# Patient Record
Sex: FEMALE | Race: WHITE | NOT HISPANIC OR LATINO | Employment: OTHER | URBAN - METROPOLITAN AREA
[De-identification: names, ages, dates, MRNs, and addresses within clinical notes are randomized per-mention and may not be internally consistent; named-entity substitution may affect disease eponyms.]

---

## 2017-10-13 ENCOUNTER — TRANSCRIBE ORDERS (OUTPATIENT)
Dept: ADMINISTRATIVE | Facility: HOSPITAL | Age: 67
End: 2017-10-13

## 2017-10-13 DIAGNOSIS — Z12.31 VISIT FOR SCREENING MAMMOGRAM: Primary | ICD-10-CM

## 2017-10-20 ENCOUNTER — HOSPITAL ENCOUNTER (OUTPATIENT)
Dept: RADIOLOGY | Facility: HOSPITAL | Age: 67
Discharge: HOME | End: 2017-10-20
Payer: MEDICARE

## 2017-10-20 DIAGNOSIS — Z12.31 VISIT FOR SCREENING MAMMOGRAM: ICD-10-CM

## 2017-10-20 PROCEDURE — G0202 SCR MAMMO BI INCL CAD: HCPCS

## 2021-04-08 DIAGNOSIS — Z23 ENCOUNTER FOR IMMUNIZATION: ICD-10-CM

## 2021-12-19 ENCOUNTER — OFFICE VISIT (OUTPATIENT)
Dept: URGENT CARE | Facility: CLINIC | Age: 71
End: 2021-12-19
Payer: MEDICARE

## 2021-12-19 VITALS
BODY MASS INDEX: 25.1 KG/M2 | TEMPERATURE: 97.9 F | RESPIRATION RATE: 18 BRPM | HEIGHT: 64 IN | DIASTOLIC BLOOD PRESSURE: 82 MMHG | WEIGHT: 147 LBS | HEART RATE: 86 BPM | SYSTOLIC BLOOD PRESSURE: 140 MMHG | OXYGEN SATURATION: 99 %

## 2021-12-19 DIAGNOSIS — B35.4 TINEA CORPORIS: Primary | ICD-10-CM

## 2021-12-19 PROCEDURE — 99213 OFFICE O/P EST LOW 20 MIN: CPT | Performed by: PHYSICIAN ASSISTANT

## 2021-12-19 RX ORDER — NYSTATIN AND TRIAMCINOLONE ACETONIDE 100000; 1 [USP'U]/G; MG/G
OINTMENT TOPICAL 2 TIMES DAILY
Qty: 60 G | Refills: 0 | Status: SHIPPED | OUTPATIENT
Start: 2021-12-19

## 2021-12-19 RX ORDER — NYSTATIN AND TRIAMCINOLONE ACETONIDE 100000; 1 [USP'U]/G; MG/G
OINTMENT TOPICAL 2 TIMES DAILY
Qty: 60 G | Refills: 0 | Status: SHIPPED | OUTPATIENT
Start: 2021-12-19 | End: 2021-12-19 | Stop reason: SDUPTHER

## 2021-12-19 NOTE — PATIENT INSTRUCTIONS
Tinea Corporis: Nystatin-triamcinolone cream prescribed to be applied topically to the area  Keep the area clean and dry otherwise  We discussed the use of baby powder to keep the area dry  Dry immediately after a shower or a bath  Follow up with a Dermatologist for further treatment and management of the lesions

## 2021-12-19 NOTE — PROGRESS NOTES
[facility]        NAME: Manuel Doty is a 66 y o  female  : 1950    MRN: [id number]  DATE: 2021  TIME: 2:54PM    Assessment and Plan   Tinea corporis [B35 4]  1  Tinea corporis  nystatin-triamcinolone (MYCOLOG-II) ointment    DISCONTINUED: nystatin-triamcinolone (MYCOLOG-II) ointment         Patient Instructions   Tinea Corporis: Nystatin-triamcinolone cream prescribed to be applied topically to the area  Keep the area clean and dry otherwise  We discussed the use of baby powder to keep the area dry  Dry immediately after a shower or a bath  Follow up with a Dermatologist for further treatment and management of the lesions  Follow up with PCP in 3-5 days  Proceed to  ER if symptoms worsen  Chief Complaint     Chief Complaint   Patient presents with    Rash     [place] X 2 YRS RESPONDS TO LOTRIMAZOLE CREAM CONCERNED THAT IS ISNT RING WORM SINCE IT HAS REOCCURRED AGAIN          History of Present Illness       The patient is a 67-year-old female who presents today for bilateral axillary rash x 2 years  She states that she has a hx of tinea corporis and was placed on Lotrisone cream which did clear her rash  She states that the annular lesions have reoccurred multiple times  She has no fever, chills, body aches  No new lotions, detergents  No new medications  No recent travel  She has considered seeing a Dermatologist        Review of Systems   Review of Systems   Constitutional: Negative for activity change, appetite change, chills, diaphoresis, fatigue and fever  HENT: Negative for facial swelling, sore throat and trouble swallowing  Respiratory: Negative for chest tightness, shortness of breath, wheezing and stridor  Cardiovascular: Negative for chest pain and palpitations  Skin: Positive for rash  Allergic/Immunologic: Negative for environmental allergies, food allergies and immunocompromised state     Neurological: Negative for dizziness and light-headedness  Hematological: Negative for adenopathy  Does not bruise/bleed easily  Current Medications       Current Outpatient Medications:     LISINOPRIL PO, Take by mouth, Disp: , Rfl:     nystatin-triamcinolone (MYCOLOG-II) ointment, Apply topically 2 (two) times a day, Disp: 60 g, Rfl: 0    Current Allergies     Allergies as of 12/19/2021    (No Known Allergies)            The following portions of the patient's history were reviewed and updated as appropriate: allergies, current medications, past family history, past medical history, past social history, past surgical history and problem list      No past medical history on file  No past surgical history on file  No family history on file  Medications have been verified  Objective   /82   Pulse 86   Temp 97 9 °F (36 6 °C)   Resp 18   Ht 5' 4" (1 626 m)   Wt 66 7 kg (147 lb)   SpO2 99%   BMI 25 23 kg/m²   No LMP recorded  Patient is postmenopausal        Physical Exam     Physical Exam  Vitals and nursing note reviewed  Constitutional:       General: She is not in acute distress  Appearance: She is well-developed  She is not diaphoretic  HENT:      Mouth/Throat:      Pharynx: Uvula midline  Cardiovascular:      Rate and Rhythm: Normal rate and regular rhythm  Pulses: Normal pulses  Heart sounds: Normal heart sounds, S1 normal and S2 normal  No murmur heard  Pulmonary:      Effort: Pulmonary effort is normal  No tachypnea, accessory muscle usage or respiratory distress  Breath sounds: Normal breath sounds  No stridor  No decreased breath sounds, wheezing, rhonchi or rales  Skin:     Capillary Refill: Capillary refill takes less than 2 seconds  Findings: Rash present  Comments:  There are multiple annular, raised, erythematous lesions with central clearing and fine scaling around the border over the axillary regions bilaterally

## 2022-07-22 ENCOUNTER — HOSPITAL ENCOUNTER (EMERGENCY)
Facility: HOSPITAL | Age: 72
Discharge: HOME | End: 2022-07-22
Attending: EMERGENCY MEDICINE
Payer: MEDICARE

## 2022-07-22 ENCOUNTER — APPOINTMENT (EMERGENCY)
Dept: RADIOLOGY | Facility: HOSPITAL | Age: 72
End: 2022-07-22
Payer: MEDICARE

## 2022-07-22 VITALS
BODY MASS INDEX: 27.36 KG/M2 | DIASTOLIC BLOOD PRESSURE: 74 MMHG | TEMPERATURE: 97.4 F | RESPIRATION RATE: 18 BRPM | HEIGHT: 64 IN | HEART RATE: 89 BPM | WEIGHT: 160.27 LBS | SYSTOLIC BLOOD PRESSURE: 146 MMHG | OXYGEN SATURATION: 96 %

## 2022-07-22 DIAGNOSIS — R55 SYNCOPE: Primary | ICD-10-CM

## 2022-07-22 LAB
ALBUMIN SERPL BCP-MCNC: 3.6 G/DL (ref 3.5–5)
ALP SERPL-CCNC: 67 U/L (ref 46–116)
ALT SERPL W P-5'-P-CCNC: 17 U/L (ref 12–78)
ANION GAP SERPL CALCULATED.3IONS-SCNC: 13 MMOL/L (ref 4–13)
APTT PPP: 24 SECONDS (ref 23–37)
AST SERPL W P-5'-P-CCNC: 5 U/L (ref 5–45)
BASOPHILS # BLD AUTO: 0.06 THOUSANDS/ΜL (ref 0–0.1)
BASOPHILS NFR BLD AUTO: 1 % (ref 0–1)
BILIRUB SERPL-MCNC: 0.35 MG/DL (ref 0.2–1)
BILIRUB UR QL STRIP: NEGATIVE
BUN SERPL-MCNC: 23 MG/DL (ref 5–25)
CALCIUM SERPL-MCNC: 8.4 MG/DL (ref 8.3–10.1)
CARDIAC TROPONIN I PNL SERPL HS: 3 NG/L
CHLORIDE SERPL-SCNC: 103 MMOL/L (ref 96–108)
CLARITY UR: CLEAR
CO2 SERPL-SCNC: 23 MMOL/L (ref 21–32)
COLOR UR: ABNORMAL
CREAT SERPL-MCNC: 0.93 MG/DL (ref 0.6–1.3)
EOSINOPHIL # BLD AUTO: 0.13 THOUSAND/ΜL (ref 0–0.61)
EOSINOPHIL NFR BLD AUTO: 1 % (ref 0–6)
ERYTHROCYTE [DISTWIDTH] IN BLOOD BY AUTOMATED COUNT: 17.4 % (ref 11.6–15.1)
ETHANOL SERPL-MCNC: 25 MG/DL (ref 0–3)
GFR SERPL CREATININE-BSD FRML MDRD: 60 ML/MIN/1.73SQ M
GLUCOSE SERPL-MCNC: 173 MG/DL (ref 65–140)
GLUCOSE UR STRIP-MCNC: ABNORMAL MG/DL
HCT VFR BLD AUTO: 34.4 % (ref 34.8–46.1)
HGB BLD-MCNC: 10.7 G/DL (ref 11.5–15.4)
HGB UR QL STRIP.AUTO: NEGATIVE
IMM GRANULOCYTES # BLD AUTO: 0.06 THOUSAND/UL (ref 0–0.2)
IMM GRANULOCYTES NFR BLD AUTO: 1 % (ref 0–2)
INR PPP: 0.97 (ref 0.84–1.19)
KETONES UR STRIP-MCNC: NEGATIVE MG/DL
LEUKOCYTE ESTERASE UR QL STRIP: NEGATIVE
LYMPHOCYTES # BLD AUTO: 2.5 THOUSANDS/ΜL (ref 0.6–4.47)
LYMPHOCYTES NFR BLD AUTO: 26 % (ref 14–44)
MCH RBC QN AUTO: 20.3 PG (ref 26.8–34.3)
MCHC RBC AUTO-ENTMCNC: 31.1 G/DL (ref 31.4–37.4)
MCV RBC AUTO: 65 FL (ref 82–98)
MONOCYTES # BLD AUTO: 0.5 THOUSAND/ΜL (ref 0.17–1.22)
MONOCYTES NFR BLD AUTO: 5 % (ref 4–12)
NEUTROPHILS # BLD AUTO: 6.5 THOUSANDS/ΜL (ref 1.85–7.62)
NEUTS SEG NFR BLD AUTO: 66 % (ref 43–75)
NITRITE UR QL STRIP: NEGATIVE
NRBC BLD AUTO-RTO: 0 /100 WBCS
PH UR STRIP.AUTO: 6 [PH]
PLATELET # BLD AUTO: 279 THOUSANDS/UL (ref 149–390)
PMV BLD AUTO: 10.2 FL (ref 8.9–12.7)
POTASSIUM SERPL-SCNC: 3.4 MMOL/L (ref 3.5–5.3)
PROT SERPL-MCNC: 7.5 G/DL (ref 6.4–8.4)
PROT UR STRIP-MCNC: NEGATIVE MG/DL
PROTHROMBIN TIME: 13 SECONDS (ref 11.6–14.5)
RBC # BLD AUTO: 5.28 MILLION/UL (ref 3.81–5.12)
SARS-COV-2 RNA RESP QL NAA+PROBE: NEGATIVE
SODIUM SERPL-SCNC: 139 MMOL/L (ref 135–147)
SP GR UR STRIP.AUTO: <=1.005 (ref 1–1.03)
UROBILINOGEN UR QL STRIP.AUTO: 0.2 E.U./DL
WBC # BLD AUTO: 9.75 THOUSAND/UL (ref 4.31–10.16)

## 2022-07-22 PROCEDURE — 96360 HYDRATION IV INFUSION INIT: CPT

## 2022-07-22 PROCEDURE — U0005 INFEC AGEN DETEC AMPLI PROBE: HCPCS | Performed by: EMERGENCY MEDICINE

## 2022-07-22 PROCEDURE — 85610 PROTHROMBIN TIME: CPT | Performed by: EMERGENCY MEDICINE

## 2022-07-22 PROCEDURE — 99284 EMERGENCY DEPT VISIT MOD MDM: CPT

## 2022-07-22 PROCEDURE — 85025 COMPLETE CBC W/AUTO DIFF WBC: CPT | Performed by: EMERGENCY MEDICINE

## 2022-07-22 PROCEDURE — U0003 INFECTIOUS AGENT DETECTION BY NUCLEIC ACID (DNA OR RNA); SEVERE ACUTE RESPIRATORY SYNDROME CORONAVIRUS 2 (SARS-COV-2) (CORONAVIRUS DISEASE [COVID-19]), AMPLIFIED PROBE TECHNIQUE, MAKING USE OF HIGH THROUGHPUT TECHNOLOGIES AS DESCRIBED BY CMS-2020-01-R: HCPCS | Performed by: EMERGENCY MEDICINE

## 2022-07-22 PROCEDURE — 93005 ELECTROCARDIOGRAM TRACING: CPT

## 2022-07-22 PROCEDURE — 80053 COMPREHEN METABOLIC PANEL: CPT | Performed by: EMERGENCY MEDICINE

## 2022-07-22 PROCEDURE — 84484 ASSAY OF TROPONIN QUANT: CPT | Performed by: EMERGENCY MEDICINE

## 2022-07-22 PROCEDURE — 99285 EMERGENCY DEPT VISIT HI MDM: CPT | Performed by: EMERGENCY MEDICINE

## 2022-07-22 PROCEDURE — 85730 THROMBOPLASTIN TIME PARTIAL: CPT | Performed by: EMERGENCY MEDICINE

## 2022-07-22 PROCEDURE — 36415 COLL VENOUS BLD VENIPUNCTURE: CPT | Performed by: EMERGENCY MEDICINE

## 2022-07-22 PROCEDURE — 82077 ASSAY SPEC XCP UR&BREATH IA: CPT | Performed by: EMERGENCY MEDICINE

## 2022-07-22 PROCEDURE — 81003 URINALYSIS AUTO W/O SCOPE: CPT | Performed by: EMERGENCY MEDICINE

## 2022-07-22 RX ADMIN — SODIUM CHLORIDE 1000 ML: 0.9 INJECTION, SOLUTION INTRAVENOUS at 20:53

## 2022-07-23 NOTE — ED PROVIDER NOTES
History  Chief Complaint   Patient presents with    Syncope     Pt was arrived EMS  Pt was out and had multiple witness syncopal episode  Pt states she started to feel light headed and had LOC  Pt denies hitting head  Pt states beside the weakness before the syncopal episode she has no other symptoms  Pt denies headache, dizziness, nausea, or blurred vision  Pt stated she did has some wine and beer before the episode  64-year-old female with past history of hypertension, presents to the ED for evaluation of syncopal episode  Patient states that earlier today patient had some wine and beer  Later patient went out to eat at a restaurant  While she was eating dinner, patient felt lightheaded  Patient then found herself on the floor surrounded by EMS people  The patient had a witnessed syncopal episode, seen by her friend  Patient brought to the ED for further evaluation  In the ED patient is back to her normal self  Patient denies any dizziness, lightheadedness, or any focal neuro deficits  Patient denies any recent fevers or chills  Patient states that she is otherwise very healthy  No similar episodes in the past   Patient was told by her friend that she had a brief moment of LOC  History provided by:  Patient  Syncope  Associated symptoms: no chest pain, no confusion, no dizziness, no fever, no headaches, no nausea, no palpitations, no shortness of breath and no weakness        Prior to Admission Medications   Prescriptions Last Dose Informant Patient Reported? Taking? LISINOPRIL PO   Yes No   Sig: Take by mouth   nystatin-triamcinolone (MYCOLOG-II) ointment   No No   Sig: Apply topically 2 (two) times a day      Facility-Administered Medications: None       Past Medical History:   Diagnosis Date    Hypertension        History reviewed  No pertinent surgical history  History reviewed  No pertinent family history    I have reviewed and agree with the history as documented  E-Cigarette/Vaping    E-Cigarette Use Never User      E-Cigarette/Vaping Substances     Social History     Tobacco Use    Smoking status: Never Smoker    Smokeless tobacco: Never Used   Vaping Use    Vaping Use: Never used   Substance Use Topics    Alcohol use: Yes     Alcohol/week: 14 0 standard drinks     Types: 14 Glasses of wine per week    Drug use: Never       Review of Systems   Constitutional: Negative for activity change, appetite change, chills and fever  HENT: Negative for congestion and ear pain  Eyes: Negative for pain and discharge  Respiratory: Negative for cough, chest tightness, shortness of breath, wheezing and stridor  Cardiovascular: Positive for syncope  Negative for chest pain and palpitations  Gastrointestinal: Negative for abdominal distention, abdominal pain, constipation, diarrhea and nausea  Endocrine: Negative for cold intolerance  Genitourinary: Negative for dysuria, frequency and urgency  Musculoskeletal: Negative for arthralgias and back pain  Skin: Negative for color change and rash  Allergic/Immunologic: Negative for environmental allergies and food allergies  Neurological: Positive for syncope  Negative for dizziness, weakness, numbness and headaches  Hematological: Negative for adenopathy  Psychiatric/Behavioral: Negative for agitation, behavioral problems and confusion  The patient is not nervous/anxious  All other systems reviewed and are negative  Physical Exam  Physical Exam  Vitals and nursing note reviewed  Constitutional:       Appearance: She is well-developed  HENT:      Head: Normocephalic and atraumatic  Eyes:      Conjunctiva/sclera: Conjunctivae normal    Cardiovascular:      Rate and Rhythm: Normal rate and regular rhythm  Heart sounds: Normal heart sounds  Pulmonary:      Effort: Pulmonary effort is normal       Breath sounds: Normal breath sounds     Abdominal:      General: Bowel sounds are normal  There is no distension  Palpations: Abdomen is soft  Tenderness: There is no abdominal tenderness  Musculoskeletal:         General: Normal range of motion  Cervical back: Normal range of motion and neck supple  Skin:     General: Skin is warm and dry  Neurological:      General: No focal deficit present  Mental Status: She is alert and oriented to person, place, and time  Comments: Patient is alert and oriented x3  Visual field intact bilateral   Finger-to-nose intact bilateral   Heel-to-shin intact bilateral   Sensory and motor strength intact bilateral   No focal neuro deficits noted  Current NIH stroke score is 0  Psychiatric:         Behavior: Behavior normal          Thought Content:  Thought content normal          Judgment: Judgment normal          Vital Signs  ED Triage Vitals [07/22/22 2028]   Temperature Pulse Respirations Blood Pressure SpO2   (!) 97 4 °F (36 3 °C) 76 18 131/96 96 %      Temp Source Heart Rate Source Patient Position - Orthostatic VS BP Location FiO2 (%)   Oral Monitor Sitting Right arm --      Pain Score       No Pain           Vitals:    07/22/22 2028 07/22/22 2056 07/22/22 2059 07/22/22 2102   BP: 131/96 149/80 152/82 146/74   Pulse: 76 80 84 89   Patient Position - Orthostatic VS: Sitting Lying - Orthostatic VS Sitting - Orthostatic VS Standing - Orthostatic VS         Visual Acuity  Visual Acuity    Flowsheet Row Most Recent Value   L Pupil Size (mm) 3   R Pupil Size (mm) 3          ED Medications  Medications   sodium chloride 0 9 % bolus 1,000 mL (0 mL Intravenous Stopped 7/22/22 2153)       Diagnostic Studies  Results Reviewed     Procedure Component Value Units Date/Time    Ethanol [[id number]]  (Abnormal) Collected: 07/22/22 2241    Lab Status: Final result Specimen: Blood from Arm, Left Updated: 07/22/22 2258     Ethanol Lvl 25 mg/dL     HS Troponin I 4hr [[id number]]     Lab Status: No result Specimen: Blood     UA w Reflex to Microscopic w Reflex to Culture [[id number]]  (Abnormal) Collected: 07/22/22 2241    Lab Status: Final result Specimen: Urine, Clean Catch Updated: 07/22/22 2247     Color, UA Light Yellow     Clarity, UA Clear     Specific Gravity, UA <=1 005     pH, UA 6 0     Leukocytes, UA Negative     Nitrite, UA Negative     Protein, UA Negative mg/dl      Glucose,  (1/10%) mg/dl      Ketones, UA Negative mg/dl      Urobilinogen, UA 0 2 E U /dl      Bilirubin, UA Negative     Occult Blood, UA Negative    COVID only [[id number]]  (Normal) Collected: 07/22/22 2051    Lab Status: Final result Specimen: Nares from Nose Updated: 07/22/22 2153     SARS-CoV-2 Negative    Narrative:      FOR PEDIATRIC PATIENTS - copy/paste COVID Guidelines URL to browser: [email or web address]  [employer]    SARS-CoV-2 assay is a Nucleic Acid Amplification assay intended for the  qualitative detection of nucleic acid from SARS-CoV-2 in nasopharyngeal  swabs  Results are for the presumptive identification of SARS-CoV-2 RNA  Positive results are indicative of infection with SARS-CoV-2, the virus  causing COVID-19, but do not rule out bacterial infection or co-infection  with other viruses  Laboratories within the [place] and its  territories are required to report all positive results to the appropriate  public health authorities  Negative results do not preclude SARS-CoV-2  infection and should not be used as the sole basis for treatment or other  patient management decisions  Negative results must be combined with  clinical observations, patient history, and epidemiological information  This test has not been FDA cleared or approved  This test has been authorized by FDA under an Emergency Use Authorization  (EUA)   This test is only authorized for the duration of time the  declaration that circumstances exist justifying the authorization of the  emergency use of an in vitro diagnostic tests for detection of SARS-CoV-2  virus and/or diagnosis of COVID-19 infection under section 564(b)(1) of  the Act, 21 U  S C  129VLO-7(K)(4), unless the authorization is terminated  or revoked sooner  The test has been validated but independent review by FDA  and CLIA is pending  Test performed using [employer]: This RT-PCR assay targets N2,  a region unique to SARS-CoV-2  A conserved region in the E-gene was chosen  for pan-Sarbecovirus detection which includes SARS-CoV-2      Comprehensive metabolic panel [[id number]]  (Abnormal) Collected: 07/22/22 2051    Lab Status: Final result Specimen: Blood from Arm, Left Updated: 07/22/22 2142     Sodium 139 mmol/L      Potassium 3 4 mmol/L      Chloride 103 mmol/L      CO2 23 mmol/L      ANION GAP 13 mmol/L      BUN 23 mg/dL      Creatinine 0 93 mg/dL      Glucose 173 mg/dL      Calcium 8 4 mg/dL      AST 5 U/L      ALT 17 U/L      Alkaline Phosphatase 67 U/L      Total Protein 7 5 g/dL      Albumin 3 6 g/dL      Total Bilirubin 0 35 mg/dL      eGFR 60 ml/min/1 73sq m     Narrative:      National Kidney Disease Foundation guidelines for Chronic Kidney Disease (CKD):     Stage 1 with normal or high GFR (GFR > 90 mL/min/1 73 square meters)    Stage 2 Mild CKD (GFR = 60-89 mL/min/1 73 square meters)    Stage 3A Moderate CKD (GFR = 45-59 mL/min/1 73 square meters)    Stage 3B Moderate CKD (GFR = 30-44 mL/min/1 73 square meters)    Stage 4 Severe CKD (GFR = 15-29 mL/min/1 73 square meters)    Stage 5 End Stage CKD (GFR <15 mL/min/1 73 square meters)  Note: GFR calculation is accurate only with a steady state creatinine    HS Troponin 0hr (reflex protocol) [[id number]]  (Normal) Collected: 07/22/22 2051    Lab Status: Final result Specimen: Blood from Arm, Left Updated: 07/22/22 2123     hs TnI 0hr 3 ng/L     HS Troponin I 2hr [[id number]]     Lab Status: No result Specimen: Blood     Protime-INR [[id number]]  (Normal) Collected: 07/22/22 2051    Lab Status: Final result Specimen: Blood from Arm, Left Updated: 07/22/22 2111     Protime 13 0 seconds      INR 0 97    APTT [[id number]]  (Normal) Collected: 07/22/22 2051    Lab Status: Final result Specimen: Blood from Arm, Left Updated: 07/22/22 2111     PTT 24 seconds     CBC and differential [[id number]]  (Abnormal) Collected: 07/22/22 2051    Lab Status: Final result Specimen: Blood from Arm, Left Updated: 07/22/22 2059     WBC 9 75 Thousand/uL      RBC 5 28 Million/uL      Hemoglobin 10 7 g/dL      Hematocrit 34 4 %      MCV 65 fL      MCH 20 3 pg      MCHC 31 1 g/dL      RDW 17 4 %      MPV 10 2 fL      Platelets 184 Thousands/uL      nRBC 0 /100 WBCs      Neutrophils Relative 66 %      Immat GRANS % 1 %      Lymphocytes Relative 26 %      Monocytes Relative 5 %      Eosinophils Relative 1 %      Basophils Relative 1 %      Neutrophils Absolute 6 50 Thousands/µL      Immature Grans Absolute 0 06 Thousand/uL      Lymphocytes Absolute 2 50 Thousands/µL      Monocytes Absolute 0 50 Thousand/µL      Eosinophils Absolute 0 13 Thousand/µL      Basophils Absolute 0 06 Thousands/µL                  No orders to display              Procedures  ECG 12 Lead Documentation Only    Date/Time: 7/22/2022 8:41 PM  Performed by: Bret Doran DO  Authorized by: Bret Doran DO     Indications / Diagnosis:  Syncope  ECG reviewed by me, the ED Provider: yes    Patient location:  ED  Previous ECG:     Previous ECG:  Compared to current    Similarity:  No change    Comparison to cardiac monitor: Yes    Interpretation:     Interpretation: normal    Comments:      Sinus rhythm rate 76, normal axis normal intervals, no acute ST/T-wave abnormalities on exam otherwise unremarkable EKG, unchanged from previous EKG  ED Course  ED Course as of 07/23/22 0025   Fri Jul 22, 2022   2250 Patient is very antsy in the exam room  Patient is walking around without any other focal neuro deficits  Patient is alert and oriented x3    Patient is refusing CT brain for further evaluation of her syncope  At this time patient would like to sign out against medical advice and have outpatient follows  I discussed the risk and benefit of signing out against medical advice which includes worsening symptoms as well as possible death  Patient understands the risks and is requesting to sign AMA paperwork  At this time patient will be discharged home with follow-up to PCP, Cardiology as well as Neurology  SBIRT 22yo+    Flowsheet Row Most Recent Value   SBIRT (25 yo +)    In order to provide better care to our patients, we are screening all of our patients for alcohol and drug use  Would it be okay to ask you these screening questions? No Filed at: 07/22/2022 2054                    MDM  Number of Diagnoses or Management Options  Syncope: new and requires workup  Diagnosis management comments: Obtain blood work, UA, CT head  Obtain orthostatic vital signs  Continue to monitor patient for any worsening symptoms       Amount and/or Complexity of Data Reviewed  Clinical lab tests: ordered and reviewed  Tests in the medicine section of CPT®: reviewed and ordered  Review and summarize past medical records: yes  Independent visualization of images, tracings, or specimens: yes    Risk of Complications, Morbidity, and/or Mortality  General comments: Patient's lab work was essentially unremarkable  Patient refused CT scan  I offered to admit patient for further evaluation of her syncopal episode however patient also refused  At this time patient would like to sign out against medical advice and have outpatient follows  I discussed the risk and benefit of signing out against medical advice which includes worsening symptoms as well as possible death  Patient understands the risks and is requesting to sign AMA paperwork  At this time patient will be discharged home with follow-up to PCP, Cardiology as well as Neurology      Patient Progress  Patient progress: improved      Disposition  Final diagnoses:   Syncope     Time reflects when diagnosis was documented in both MDM as applicable and the Disposition within this note     Time User Action Codes Description Comment    7/22/2022 10:55 PM Sg Catherine Add [R55] Syncope       ED Disposition     ED Disposition   AMA    Condition   --    Date/Time   Fri Jul 22, 2022 10:54 PM    Comment   Date: 7/22/2022  Patient: Leticia Gonsales  Admitted: 7/22/2022  8:23 PM  Attending Provider: Bing Caruso DO Edjeramie Gonsales or her authorized caregiver has made the decision for the patient to leave the emergency department against the advice of [facility] emergency department staff  She or her authorized caregiver has been informed and understands the inherent risks, including death  She or her authorized caregiver has decided to accept the responsibility for this decision  Leticia Gonsales and all [facility] parties have been advised that she may return for further evaluation or treatment  Her condition at time of discharge was stable    Leticia Gonsales had current vital signs as follows:  /74 (BP Location: Right arm)   Pulse 89   Temp (!) 97  4 °F (36 3 °C) (Oral)   Resp 18   Ht 5' 4" (1 626 m)   Wt 72 7 kg (160 lb 4 4 oz)            Follow-up Information     Follow up With Specialties Details Why Contact Info    FRAN Borjas Nurse Practitioner In 3 days  [facility]      Sadia Good MD Cardiology Schedule an appointment as soon as possible for a visit  Offer further evaluation of her syncopal episode [address] [place] [address]  2 Rehab Siva Jeff MD Neurology  For further evaluation of her syncopal episode [address]  [phone]            Discharge Medication List as of 7/22/2022 10:55 PM      CONTINUE these medications which have NOT CHANGED    Details   LISINOPRIL PO Take by mouth, Historical Med      nystatin-triamcinolone (MYCOLOG-II) ointment Apply topically 2 (two) times a day, Starting Sun 12/19/2021, Normal             No discharge procedures on file      PDMP Review     None          ED Provider  Electronically Signed by           Lisha Swartz DO  07/23/22 0025

## 2022-07-23 NOTE — ED NOTES
Pt stated she understood the risks of leaving against medical advice and signed AMA form  Patient requested a copy of AMA form and one was provided for her        Jermaine Patricia, CLARITZA  07/22/22 2451

## 2022-07-25 LAB
ATRIAL RATE: 76 BPM
P AXIS: 54 DEGREES
PR INTERVAL: 190 MS
QRS AXIS: 15 DEGREES
QRSD INTERVAL: 94 MS
QT INTERVAL: 408 MS
QTC INTERVAL: 459 MS
T WAVE AXIS: 62 DEGREES
VENTRICULAR RATE: 76 BPM

## 2022-07-25 PROCEDURE — 93010 ELECTROCARDIOGRAM REPORT: CPT | Performed by: INTERNAL MEDICINE

## 2023-05-03 ENCOUNTER — APPOINTMENT (OUTPATIENT)
Dept: LAB | Facility: HOSPITAL | Age: 73
End: 2023-05-03

## 2023-05-03 DIAGNOSIS — S10.96XA TICK BITE OF NECK, INITIAL ENCOUNTER: ICD-10-CM
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
## 2023-05-03 DIAGNOSIS — R21 RASH: ICD-10-CM

## 2023-05-03 LAB — B BURGDOR IGG+IGM SER-ACNC: <0.2 AI
